# Patient Record
Sex: MALE | Race: WHITE | NOT HISPANIC OR LATINO | Employment: OTHER | URBAN - METROPOLITAN AREA
[De-identification: names, ages, dates, MRNs, and addresses within clinical notes are randomized per-mention and may not be internally consistent; named-entity substitution may affect disease eponyms.]

---

## 2022-01-01 ENCOUNTER — TELEPHONE (OUTPATIENT)
Dept: GERIATRICS | Facility: CLINIC | Age: 77
End: 2022-01-01

## 2022-01-01 ENCOUNTER — NURSING HOME VISIT (OUTPATIENT)
Dept: GERIATRICS | Facility: CLINIC | Age: 77
End: 2022-01-01
Payer: COMMERCIAL

## 2022-01-01 ENCOUNTER — APPOINTMENT (OUTPATIENT)
Dept: RADIOLOGY | Facility: HOSPITAL | Age: 77
End: 2022-01-01
Attending: EMERGENCY MEDICINE
Payer: COMMERCIAL

## 2022-01-01 ENCOUNTER — APPOINTMENT (OUTPATIENT)
Dept: CT IMAGING | Facility: HOSPITAL | Age: 77
End: 2022-01-01
Attending: EMERGENCY MEDICINE
Payer: COMMERCIAL

## 2022-01-01 ENCOUNTER — DOCUMENTATION ONLY (OUTPATIENT)
Dept: GERIATRICS | Facility: CLINIC | Age: 77
End: 2022-01-01

## 2022-01-01 ENCOUNTER — LAB REQUISITION (OUTPATIENT)
Dept: LAB | Facility: CLINIC | Age: 77
End: 2022-01-01
Payer: COMMERCIAL

## 2022-01-01 ENCOUNTER — HOSPITAL ENCOUNTER (EMERGENCY)
Facility: HOSPITAL | Age: 77
Discharge: HOME | End: 2022-07-14
Attending: EMERGENCY MEDICINE | Admitting: EMERGENCY MEDICINE
Payer: COMMERCIAL

## 2022-01-01 ENCOUNTER — CLINICAL UPDATE (OUTPATIENT)
Dept: PHARMACY | Facility: CLINIC | Age: 77
End: 2022-01-01
Payer: COMMERCIAL

## 2022-01-01 VITALS
RESPIRATION RATE: 20 BRPM | TEMPERATURE: 98.1 F | DIASTOLIC BLOOD PRESSURE: 67 MMHG | HEART RATE: 66 BPM | SYSTOLIC BLOOD PRESSURE: 125 MMHG | OXYGEN SATURATION: 93 %

## 2022-01-01 VITALS
OXYGEN SATURATION: 95 % | WEIGHT: 178.2 LBS | HEIGHT: 60 IN | SYSTOLIC BLOOD PRESSURE: 108 MMHG | RESPIRATION RATE: 16 BRPM | BODY MASS INDEX: 34.99 KG/M2 | DIASTOLIC BLOOD PRESSURE: 52 MMHG | HEART RATE: 74 BPM | TEMPERATURE: 97.7 F

## 2022-01-01 VITALS
WEIGHT: 179.8 LBS | DIASTOLIC BLOOD PRESSURE: 63 MMHG | SYSTOLIC BLOOD PRESSURE: 124 MMHG | HEIGHT: 60 IN | BODY MASS INDEX: 35.3 KG/M2 | RESPIRATION RATE: 18 BRPM | TEMPERATURE: 97.4 F | OXYGEN SATURATION: 96 % | HEART RATE: 64 BPM

## 2022-01-01 VITALS
DIASTOLIC BLOOD PRESSURE: 63 MMHG | BODY MASS INDEX: 36.52 KG/M2 | SYSTOLIC BLOOD PRESSURE: 124 MMHG | OXYGEN SATURATION: 94 % | HEIGHT: 60 IN | TEMPERATURE: 97.3 F | RESPIRATION RATE: 18 BRPM | HEART RATE: 64 BPM | WEIGHT: 186 LBS

## 2022-01-01 VITALS
BODY MASS INDEX: 35.65 KG/M2 | TEMPERATURE: 96.8 F | SYSTOLIC BLOOD PRESSURE: 120 MMHG | HEIGHT: 60 IN | DIASTOLIC BLOOD PRESSURE: 63 MMHG | WEIGHT: 181.6 LBS | OXYGEN SATURATION: 92 % | HEART RATE: 65 BPM | RESPIRATION RATE: 18 BRPM

## 2022-01-01 VITALS
RESPIRATION RATE: 18 BRPM | HEIGHT: 60 IN | HEART RATE: 80 BPM | DIASTOLIC BLOOD PRESSURE: 62 MMHG | TEMPERATURE: 98.2 F | SYSTOLIC BLOOD PRESSURE: 132 MMHG | WEIGHT: 187.6 LBS | BODY MASS INDEX: 36.83 KG/M2 | OXYGEN SATURATION: 93 %

## 2022-01-01 VITALS
BODY MASS INDEX: 31.71 KG/M2 | WEIGHT: 161.5 LBS | SYSTOLIC BLOOD PRESSURE: 120 MMHG | HEART RATE: 88 BPM | OXYGEN SATURATION: 97 % | HEIGHT: 60 IN | DIASTOLIC BLOOD PRESSURE: 67 MMHG | TEMPERATURE: 97.1 F | RESPIRATION RATE: 18 BRPM

## 2022-01-01 VITALS
OXYGEN SATURATION: 97 % | RESPIRATION RATE: 16 BRPM | DIASTOLIC BLOOD PRESSURE: 60 MMHG | WEIGHT: 181.6 LBS | TEMPERATURE: 97.4 F | HEIGHT: 60 IN | HEART RATE: 61 BPM | SYSTOLIC BLOOD PRESSURE: 127 MMHG | BODY MASS INDEX: 35.65 KG/M2

## 2022-01-01 VITALS
TEMPERATURE: 97.6 F | OXYGEN SATURATION: 98 % | BODY MASS INDEX: 36.69 KG/M2 | HEIGHT: 60 IN | HEART RATE: 72 BPM | RESPIRATION RATE: 18 BRPM | WEIGHT: 186.9 LBS | DIASTOLIC BLOOD PRESSURE: 67 MMHG | SYSTOLIC BLOOD PRESSURE: 124 MMHG

## 2022-01-01 DIAGNOSIS — G30.0 SEVERE EARLY ONSET ALZHEIMER'S DEMENTIA WITH AGITATION (H): Primary | ICD-10-CM

## 2022-01-01 DIAGNOSIS — Z79.899 MEDICATION MANAGEMENT: ICD-10-CM

## 2022-01-01 DIAGNOSIS — F02.C4 SEVERE EARLY ONSET ALZHEIMER'S DEMENTIA WITH ANXIETY (H): ICD-10-CM

## 2022-01-01 DIAGNOSIS — F02.C11 SEVERE EARLY ONSET ALZHEIMER'S DEMENTIA WITH AGITATION (H): Primary | ICD-10-CM

## 2022-01-01 DIAGNOSIS — I10 ESSENTIAL (PRIMARY) HYPERTENSION: ICD-10-CM

## 2022-01-01 DIAGNOSIS — Z79.4 TYPE 2 DIABETES MELLITUS WITH OTHER SPECIFIED COMPLICATION, WITH LONG-TERM CURRENT USE OF INSULIN (H): Primary | ICD-10-CM

## 2022-01-01 DIAGNOSIS — E11.9 DIABETES MELLITUS, TYPE 2 (H): ICD-10-CM

## 2022-01-01 DIAGNOSIS — F03.91 DEMENTIA WITH BEHAVIORAL DISTURBANCE, UNSPECIFIED DEMENTIA TYPE: Primary | ICD-10-CM

## 2022-01-01 DIAGNOSIS — I10 PRIMARY HYPERTENSION: ICD-10-CM

## 2022-01-01 DIAGNOSIS — E66.01 MORBID OBESITY (H): ICD-10-CM

## 2022-01-01 DIAGNOSIS — E11.69 TYPE 2 DIABETES MELLITUS WITH OTHER SPECIFIED COMPLICATION, WITH LONG-TERM CURRENT USE OF INSULIN (H): ICD-10-CM

## 2022-01-01 DIAGNOSIS — F03.918 AGGRESSIVE BEHAVIOR DUE TO DEMENTIA (H): ICD-10-CM

## 2022-01-01 DIAGNOSIS — E11.69 TYPE 2 DIABETES MELLITUS WITH OTHER SPECIFIED COMPLICATION, UNSPECIFIED WHETHER LONG TERM INSULIN USE (H): Primary | ICD-10-CM

## 2022-01-01 DIAGNOSIS — E11.69 TYPE 2 DIABETES MELLITUS WITH OTHER SPECIFIED COMPLICATION, WITH LONG-TERM CURRENT USE OF INSULIN (H): Primary | ICD-10-CM

## 2022-01-01 DIAGNOSIS — Z79.4 TYPE 2 DIABETES MELLITUS WITH OTHER SPECIFIED COMPLICATION, WITH LONG-TERM CURRENT USE OF INSULIN (H): ICD-10-CM

## 2022-01-01 DIAGNOSIS — G30.9 ALZHEIMER'S DISEASE (H): ICD-10-CM

## 2022-01-01 DIAGNOSIS — R45.1 AGITATION: Primary | ICD-10-CM

## 2022-01-01 DIAGNOSIS — G30.0 SEVERE EARLY ONSET ALZHEIMER'S DEMENTIA WITHOUT BEHAVIORAL DISTURBANCE, PSYCHOTIC DISTURBANCE, MOOD DISTURBANCE, OR ANXIETY (H): ICD-10-CM

## 2022-01-01 DIAGNOSIS — E11.319 TYPE 2 DIABETES MELLITUS WITH UNSPECIFIED DIABETIC RETINOPATHY WITHOUT MACULAR EDEMA (H): ICD-10-CM

## 2022-01-01 DIAGNOSIS — S63.259A DISLOCATION OF FINGER, INITIAL ENCOUNTER: ICD-10-CM

## 2022-01-01 DIAGNOSIS — F02.80 ALZHEIMER'S DISEASE (H): ICD-10-CM

## 2022-01-01 DIAGNOSIS — E78.5 HYPERLIPIDEMIA, UNSPECIFIED: ICD-10-CM

## 2022-01-01 DIAGNOSIS — S01.81XA FACIAL LACERATION, INITIAL ENCOUNTER: ICD-10-CM

## 2022-01-01 DIAGNOSIS — G30.0 SEVERE EARLY ONSET ALZHEIMER'S DEMENTIA WITH ANXIETY (H): ICD-10-CM

## 2022-01-01 DIAGNOSIS — F32.A DEPRESSION, UNSPECIFIED DEPRESSION TYPE: ICD-10-CM

## 2022-01-01 DIAGNOSIS — F02.C0 SEVERE EARLY ONSET ALZHEIMER'S DEMENTIA WITHOUT BEHAVIORAL DISTURBANCE, PSYCHOTIC DISTURBANCE, MOOD DISTURBANCE, OR ANXIETY (H): ICD-10-CM

## 2022-01-01 DIAGNOSIS — D64.9 ANEMIA, UNSPECIFIED: ICD-10-CM

## 2022-01-01 LAB
ALBUMIN SERPL BCG-MCNC: 3.9 G/DL (ref 3.5–5.2)
ALP SERPL-CCNC: 82 U/L (ref 40–129)
ALT SERPL W P-5'-P-CCNC: 20 U/L (ref 10–50)
ANION GAP SERPL CALCULATED.3IONS-SCNC: 8 MMOL/L (ref 7–15)
AST SERPL W P-5'-P-CCNC: 22 U/L (ref 10–50)
BILIRUB SERPL-MCNC: 0.4 MG/DL
BUN SERPL-MCNC: 15.1 MG/DL (ref 8–23)
CALCIUM SERPL-MCNC: 9.3 MG/DL (ref 8.8–10.2)
CHLORIDE SERPL-SCNC: 102 MMOL/L (ref 98–107)
CHOLEST SERPL-MCNC: 187 MG/DL
CREAT SERPL-MCNC: 0.77 MG/DL (ref 0.67–1.17)
DEPRECATED HCO3 PLAS-SCNC: 28 MMOL/L (ref 22–29)
ERYTHROCYTE [DISTWIDTH] IN BLOOD BY AUTOMATED COUNT: 13.2 % (ref 10–15)
GFR SERPL CREATININE-BSD FRML MDRD: >90 ML/MIN/1.73M2
GLUCOSE SERPL-MCNC: 88 MG/DL (ref 70–99)
HBA1C MFR BLD: 9.6 %
HCT VFR BLD AUTO: 43.5 % (ref 40–53)
HDLC SERPL-MCNC: 45 MG/DL
HGB BLD-MCNC: 14.7 G/DL (ref 13.3–17.7)
LDLC SERPL CALC-MCNC: 122 MG/DL
MCH RBC QN AUTO: 31.4 PG (ref 26.5–33)
MCHC RBC AUTO-ENTMCNC: 33.8 G/DL (ref 31.5–36.5)
MCV RBC AUTO: 93 FL (ref 78–100)
NONHDLC SERPL-MCNC: 142 MG/DL
PLATELET # BLD AUTO: 252 10E3/UL (ref 150–450)
POTASSIUM SERPL-SCNC: 3.7 MMOL/L (ref 3.4–5.3)
PROT SERPL-MCNC: 6.4 G/DL (ref 6.4–8.3)
RBC # BLD AUTO: 4.68 10E6/UL (ref 4.4–5.9)
SODIUM SERPL-SCNC: 138 MMOL/L (ref 136–145)
TRIGL SERPL-MCNC: 99 MG/DL
WBC # BLD AUTO: 8 10E3/UL (ref 4–11)

## 2022-01-01 PROCEDURE — 99309 SBSQ NF CARE MODERATE MDM 30: CPT | Performed by: INTERNAL MEDICINE

## 2022-01-01 PROCEDURE — 99305 1ST NF CARE MODERATE MDM 35: CPT | Performed by: INTERNAL MEDICINE

## 2022-01-01 PROCEDURE — 70450 CT HEAD/BRAIN W/O DYE: CPT

## 2022-01-01 PROCEDURE — 99285 EMERGENCY DEPT VISIT HI MDM: CPT | Mod: 25

## 2022-01-01 PROCEDURE — 70486 CT MAXILLOFACIAL W/O DYE: CPT

## 2022-01-01 PROCEDURE — 99310 SBSQ NF CARE HIGH MDM 45: CPT | Performed by: NURSE PRACTITIONER

## 2022-01-01 PROCEDURE — 99309 SBSQ NF CARE MODERATE MDM 30: CPT | Performed by: NURSE PRACTITIONER

## 2022-01-01 PROCEDURE — 26770 TREAT FINGER DISLOCATION: CPT | Mod: F2

## 2022-01-01 PROCEDURE — 73140 X-RAY EXAM OF FINGER(S): CPT | Mod: LT

## 2022-01-01 PROCEDURE — 999N000065 XR FINGER LEFT G/E 2 VIEWS

## 2022-01-01 PROCEDURE — 83036 HEMOGLOBIN GLYCOSYLATED A1C: CPT | Performed by: NURSE PRACTITIONER

## 2022-01-01 PROCEDURE — 85014 HEMATOCRIT: CPT | Performed by: NURSE PRACTITIONER

## 2022-01-01 PROCEDURE — 99318 PR ANNUAL NURSING FAC ASSESSMNT, STABLE: CPT | Performed by: NURSE PRACTITIONER

## 2022-01-01 PROCEDURE — 36415 COLL VENOUS BLD VENIPUNCTURE: CPT | Performed by: NURSE PRACTITIONER

## 2022-01-01 PROCEDURE — 72125 CT NECK SPINE W/O DYE: CPT

## 2022-01-01 PROCEDURE — P9604 ONE-WAY ALLOW PRORATED TRIP: HCPCS | Performed by: NURSE PRACTITIONER

## 2022-01-01 PROCEDURE — 80061 LIPID PANEL: CPT | Performed by: NURSE PRACTITIONER

## 2022-01-01 PROCEDURE — 80053 COMPREHEN METABOLIC PANEL: CPT | Performed by: NURSE PRACTITIONER

## 2022-01-01 PROCEDURE — 12011 RPR F/E/E/N/L/M 2.5 CM/<: CPT

## 2022-01-01 PROCEDURE — 99207 PR NO CHARGE LOS: CPT | Performed by: PHARMACIST

## 2022-01-01 RX ORDER — ACETAMINOPHEN 500 MG
500 TABLET ORAL 3 TIMES DAILY
COMMUNITY

## 2022-01-01 RX ORDER — RISPERIDONE 1 MG/1
TABLET ORAL
COMMUNITY
Start: 2022-01-01 | End: 2022-01-01 | Stop reason: DRUGHIGH

## 2022-01-01 RX ORDER — RISPERIDONE 1 MG/1
TABLET ORAL
COMMUNITY
Start: 2022-01-01

## 2022-01-01 RX ORDER — MULTIVITAMIN,THERAPEUTIC
1 TABLET ORAL DAILY
COMMUNITY

## 2022-01-01 RX ORDER — CITALOPRAM HYDROBROMIDE 10 MG/1
15 TABLET ORAL DAILY
COMMUNITY
Start: 2022-01-01

## 2022-01-01 RX ORDER — LIRAGLUTIDE 6 MG/ML
INJECTION SUBCUTANEOUS
Qty: 3 ML | Refills: 11 | Status: SHIPPED | OUTPATIENT
Start: 2022-01-01 | End: 2022-01-01

## 2022-01-01 RX ORDER — QUETIAPINE FUMARATE 50 MG/1
50 TABLET, FILM COATED ORAL 3 TIMES DAILY
COMMUNITY
Start: 2022-01-01 | End: 2022-01-01

## 2022-01-01 RX ORDER — LORAZEPAM 0.5 MG/1
0.5 TABLET ORAL EVERY 6 HOURS PRN
Qty: 30 TABLET | Refills: 0 | Status: SHIPPED | OUTPATIENT
Start: 2022-01-01

## 2022-01-01 RX ORDER — AMOXICILLIN 250 MG
2 CAPSULE ORAL 2 TIMES DAILY
COMMUNITY

## 2022-01-01 RX ORDER — ESCITALOPRAM OXALATE 20 MG/1
20 TABLET ORAL DAILY
COMMUNITY
End: 2022-01-01 | Stop reason: ALTCHOICE

## 2022-01-01 RX ORDER — HALOPERIDOL 1 MG/1
3 TABLET ORAL 3 TIMES DAILY
COMMUNITY
End: 2022-01-01 | Stop reason: ALTCHOICE

## 2022-01-01 RX ORDER — LIRAGLUTIDE 6 MG/ML
1.2 INJECTION SUBCUTANEOUS DAILY
COMMUNITY

## 2022-01-01 RX ORDER — QUETIAPINE FUMARATE 50 MG/1
50 TABLET, FILM COATED ORAL DAILY PRN
COMMUNITY
End: 2022-01-01

## 2022-01-01 RX ORDER — METFORMIN HCL 500 MG
2000 TABLET, EXTENDED RELEASE 24 HR ORAL
COMMUNITY
Start: 2022-01-01

## 2022-01-01 RX ORDER — QUETIAPINE FUMARATE 25 MG/1
12.5 TABLET, FILM COATED ORAL DAILY PRN
COMMUNITY
End: 2022-01-01

## 2022-01-01 RX ORDER — LANOLIN ALCOHOL/MO/W.PET/CERES
12 CREAM (GRAM) TOPICAL AT BEDTIME
COMMUNITY

## 2022-07-12 NOTE — LETTER
7/12/2022        RE: Beltran Cotter  [address] [facility] [address]        [facility] GERIATRICS  INITIAL VISIT NOTE  July 12, 2022    PRIMARY CARE PROVIDER AND CLINIC:  Danna Esp[facility] [address] [facility] / [place]*    [facility] Medical Record Number:  [id number]  Place of Service where encounter took place:  [facility] () [[id number]]    Chief Complaint   Patient presents with     Establish Care       HPI:    Beltran Cotter is a 77 year old  (1945) male was admitted to the above facility from  home on 6/30/22.  [facility] history of dementia with depressed mood and aggressive behavior, HTN and DM with neuropathy and retinopathy. Ultimate plan is to move to the [facility], but bed not available at this time. He was admitted to this facility for  medical management and nursing care.      History obtained from: facility chart records, facility staff and [facility] chart review.      Today, Mr. Cotter is seen sitting in a chair in the common room. He opened his eyes to my calling his name, but dozed back to sleep. Talked with his nurse as well as the activities staff and he's settling in well. He didn't want to take his metformin this morning, but was fine with the other pills as they are smaller. SLUMS 0/30 at [facility] - no cog testing at [facility]. I believe plan/goal is for him to transfer to a LTC bed at the [facility] when one is available.     CODE STATUS: DNR / DNI    ALLERGIES:  No Known Allergies    PAST MEDICAL HISTORY:   Past Medical History:   Diagnosis Date     Adenoma of large intestine      Alzheimer's disease (H)      Background diabetic retinopathy (H)      Cataract      Diabetes mellitus (H)      Hearing loss      Peripheral neuropathy      Polio        PAST SURGICAL HISTORY:   Past Surgical History:   Procedure Laterality Date     CATARACT REMOVAL       CHOLECYSTECTOMY       COLONOSCOPY       ORIF LEFT  ANKLE FX         FAMILY HISTORY:   Family History   Problem Relation Age of Onset     Cervical Cancer Mother      Dementia Father      Lung Cancer Maternal Grandfather      SOCIAL HISTORY:   Patient's living condition: lives in a skilled nursing facility    MEDICATIONS  Post Discharge Medication Reconciliation Status: discharge medications reconciled and changed, per note/orders.  Current Outpatient Medications   Medication Sig Dispense Refill     acetaminophen (TYLENOL) 500 MG tablet Take 500 mg by mouth every 6 hours as needed for mild pain       Carboxymethylcellulose Sodium 0.25 % SOLN Apply 1 drop to eye 3 times daily To both eyes TID for dry eyes       escitalopram (LEXAPRO) 20 MG tablet Take 20 mg by mouth daily       glucose (BD GLUCOSE) 4 g chewable tablet Take 4 tablets by mouth as needed for low blood sugar (for glucose <70)       insulin glargine (LANTUS PEN) 100 UNIT/ML pen Inject 30 Units Subcutaneous every morning       melatonin 3 MG tablet Take 12 mg by mouth At Bedtime       metFORMIN (GLUCOPHAGE) 500 MG tablet Take 2,000 mg by mouth daily (with breakfast)       multivitamin, therapeutic (THERA-VIT) TABS tablet Take 1 tablet by mouth daily       QUEtiapine (SEROQUEL) 25 MG tablet Take 12.5 mg by mouth daily as needed (agitation)       QUEtiapine (SEROQUEL) 50 MG tablet Take 50 mg by mouth 2 times daily       QUEtiapine (SEROQUEL) 50 MG tablet Take 50 mg by mouth daily as needed (agitation)         ROS:  Unable to obtain due to cognitive impairment or aphasia. SLUMS 0/30.     PHYSICAL EXAM:  /62   Pulse 80   Temp 98.2  F (36.8  C)   Resp 18   Ht 1.524 m (5')   Wt 85.1 kg (187 lb 9.6 oz)   SpO2 93%   BMI 36.64 kg/m    Gen: sitting in chair, eyes closed and in no acute distress  HEENT: normocephalic; moist mucous membranes in mouth; unable to assess hearing as was not responding to me calling his name; eyes without scleral icterus or scleral injection   Resp: breathing non labored, no  tachypnea   Ext: decreased muscle tone no LE edema  Neuro: CX II-XII grossly in tact; ROM in all four extremities grossly in tact  Psych: memory, judgement and insight impaired.   Skin: pale; no obvious concerning rashes or lesions    LABORATORY/IMAGING DATA:  Reviewed as per [facility] and/or [facility]    ASSESSMENT/PLAN:    Dementia With Behavioral Disturbance  SLUMS 0/30. He is settling in OK per staff  -- escitalopram 20 mg daily  -- quetiapine 50 mg BID and 50 mg daily PRN as well as 12.5 mg BID PRN for agitation   -- ongoing 24/7 nursing and supportive cares    HTN  SBPs - no trend in PCC. He is not on any anti-HTN medications.   -- follow BPs     DM, Type II  Hgb A1c not known. Sugars 130s-180s (am), 160s-250s (hs). He's not wanting to take the metformin per nursing  -- glargine 30 units in the morning  -- metformin 2000 mg in the morning   -- if continues to refuse, am OK discontinuing    this if spouse is Ok with it    Electronically signed by:  Gail Milian MD                 Sincerely,        Gail Milian MD

## 2022-07-12 NOTE — PROGRESS NOTES
[facility] GERIATRICS  INITIAL VISIT NOTE  July 12, 2022    PRIMARY CARE PROVIDER AND CLINIC:  Danna Espinal [facility] [address] [facility] / [place]*    [facility] Medical Record Number:  [id number]  Place of Service where encounter took place:  [facility] () [[id number]]    Chief Complaint   Patient presents with     Establish Care       HPI:    Beltran Cotter is a 77 year old  (1945) male was admitted to the above facility from  home on 6/30/22.  [facility] history of dementia with depressed mood and aggressive behavior, HTN and DM with neuropathy and retinopathy. Ultimate plan is to move to the [facility], but bed not available at this time. He was admitted to this facility for  medical management and nursing care.      History obtained from: facility chart records, facility staff and [facility] chart review.      Today, Mr. Cotter is seen sitting in a chair in the common room. He opened his eyes to my calling his name, but dozed back to sleep. Talked with his nurse as well as the activities staff and he's settling in well. He didn't want to take his metformin this morning, but was fine with the other pills as they are smaller. SLUMS 0/30 at [facility] - no cog testing at [facility]. I believe plan/goal is for him to transfer to a LTC bed at the [facility] when one is available.     CODE STATUS: DNR / DNI    ALLERGIES:  No Known Allergies    PAST MEDICAL HISTORY:   Past Medical History:   Diagnosis Date     Adenoma of large intestine      Alzheimer's disease (H)      Background diabetic retinopathy (H)      Cataract      Diabetes mellitus (H)      Hearing loss      Peripheral neuropathy      Polio        PAST SURGICAL HISTORY:   Past Surgical History:   Procedure Laterality Date     CATARACT REMOVAL       CHOLECYSTECTOMY       COLONOSCOPY       ORIF LEFT ANKLE FX         FAMILY HISTORY:   Family History   Problem Relation Age of Onset     Cervical Cancer  Mother      Dementia Father      Lung Cancer Maternal Grandfather      SOCIAL HISTORY:   Patient's living condition: lives in a skilled nursing facility    MEDICATIONS  Post Discharge Medication Reconciliation Status: discharge medications reconciled and changed, per note/orders.  Current Outpatient Medications   Medication Sig Dispense Refill     acetaminophen (TYLENOL) 500 MG tablet Take 500 mg by mouth every 6 hours as needed for mild pain       Carboxymethylcellulose Sodium 0.25 % SOLN Apply 1 drop to eye 3 times daily To both eyes TID for dry eyes       escitalopram (LEXAPRO) 20 MG tablet Take 20 mg by mouth daily       glucose (BD GLUCOSE) 4 g chewable tablet Take 4 tablets by mouth as needed for low blood sugar (for glucose <70)       insulin glargine (LANTUS PEN) 100 UNIT/ML pen Inject 30 Units Subcutaneous every morning       melatonin 3 MG tablet Take 12 mg by mouth At Bedtime       metFORMIN (GLUCOPHAGE) 500 MG tablet Take 2,000 mg by mouth daily (with breakfast)       multivitamin, therapeutic (THERA-VIT) TABS tablet Take 1 tablet by mouth daily       QUEtiapine (SEROQUEL) 25 MG tablet Take 12.5 mg by mouth daily as needed (agitation)       QUEtiapine (SEROQUEL) 50 MG tablet Take 50 mg by mouth 2 times daily       QUEtiapine (SEROQUEL) 50 MG tablet Take 50 mg by mouth daily as needed (agitation)         ROS:  Unable to obtain due to cognitive impairment or aphasia. SLUMS 0/30.     PHYSICAL EXAM:  /62   Pulse 80   Temp 98.2  F (36.8  C)   Resp 18   Ht 1.524 m (5')   Wt 85.1 kg (187 lb 9.6 oz)   SpO2 93%   BMI 36.64 kg/m    Gen: sitting in chair, eyes closed and in no acute distress  HEENT: normocephalic; moist mucous membranes in mouth; unable to assess hearing as was not responding to me calling his name; eyes without scleral icterus or scleral injection   Resp: breathing non labored, no tachypnea   Ext: decreased muscle tone no LE edema  Neuro: CX II-XII grossly in tact; ROM in all four  extremities grossly in tact  Psych: memory, judgement and insight impaired.   Skin: pale; no obvious concerning rashes or lesions    LABORATORY/IMAGING DATA:  Reviewed as per [facility] and/or [facility]    ASSESSMENT/PLAN:    Dementia With Behavioral Disturbance  SLUMS 0/30. He is settling in OK per staff  -- escitalopram 20 mg daily  -- quetiapine 50 mg BID and 50 mg daily PRN as well as 12.5 mg BID PRN for agitation   -- ongoing 24/7 nursing and supportive cares    HTN  SBPs - no trend in PCC. He is not on any anti-HTN medications.   -- follow BPs     DM, Type II  Hgb A1c not known. Sugars 130s-180s (am), 160s-250s (hs). He's not wanting to take the metformin per nursing  -- glargine 30 units in the morning  -- metformin 2000 mg in the morning   -- if continues to refuse, am OK discontinuing    this if spouse is Ok with it    Electronically signed by:  Gail Milian MD

## 2022-07-15 NOTE — ED PROVIDER NOTES
EMERGENCY DEPARTMENT ENCOUNTER     NAME: Beltran Cotter   AGE: 77 year old male   YOB: 1945   MRN: [id number]   EVALUATION DATE & TIME: 7/14/2022  7:43 PM   PCP: Danna Espinal     Chief Complaint   Patient presents with     Fall   :    FINAL IMPRESSION       1. Dislocation of finger, initial encounter    2. Facial laceration, initial encounter           ED COURSE & MEDICAL DECISION MAKING    7:44 PM I met with the patient to gather history and to perform my initial exam. We discussed plans for the ED course, including diagnostic testing and treatment. I saw the patient wearing a face shield, N95 mask, surgical mask, surgical gown, and gloves.   8:25 PM Rechecked and updated patient. Performed reduction procedure.   8:59 PM Rechecked and updated patient.     Pertinent Labs & Imaging studies reviewed. (See chart for details)   77 year old male  presents to the Emergency Department for evaluation of a fall when he had a witnessed fall into a medication cart at his [facility]. Initial Vitals Reviewed. Initial exam notable for patient who is sleeping but intermittently agitated with a history of dementia, has an obviously deformed left middle finger and some dried blood over his left eyebrow.  I did do a CT scan of his head, cervical spine, and facial bones to look for fracture, skull fracture, intracranial hemorrhage and this was fortunately negative.  I suspected finger dislocation versus fracture and x-ray confirmed a dislocation.  I reduce this manually and placed his finger in a splint, post reduction films show resolution.  Once the area on his head was irrigated it is obvious that there is a superficial laceration.  It was oozing blood, so I did a skin glue repair after irrigation.  At this time, I will discharge back to his facility.           At the conclusion of the encounter I discussed the results of all of the tests and the disposition. The questions were answered. The patient or family  acknowledged understanding and was agreeable with the care plan.         MEDICATIONS GIVEN IN THE EMERGENCY:   Medications - No data to display   NEW PRESCRIPTIONS STARTED AT TODAY'S ER VISIT   New Prescriptions    No medications on file     ================================================================   HISTORY OF PRESENT ILLNESS     HPI is severely limited due to patient Alzheimer's and dementia     Patient information was obtained from: EMS     Use of Intrepreter: N/A     Beltran Cotter is a 77 year old male with a pertinent medical history of Alzheimer's disease, dementia, T2DM, diabetic neuropathy, peripheral neuropathy, Polio, who presents to the ED for evaluation of a finger injury.     Per Chart Review, patient's last TD/TDAP was 07/19/2013.    Per EMS, patient fell into a med cart at his memory care unit just prior to arrival causing him to deform his left middle finger and develop an abrasion on his left eyebrow.     ================================================================    REVIEW OF SYSTEMS       Review of Systems   Unable to perform ROS: Dementia         PAST HISTORY     PAST MEDICAL HISTORY:   Past Medical History:   Diagnosis Date     Adenoma of large intestine      Alzheimer's disease (H)      Background diabetic retinopathy (H)      Cataract      Diabetes mellitus (H)      Hearing loss      Peripheral neuropathy      Polio       PAST SURGICAL HISTORY:   Past Surgical History:   Procedure Laterality Date     CATARACT REMOVAL       CHOLECYSTECTOMY       COLONOSCOPY       ORIF LEFT ANKLE FX        CURRENT MEDICATIONS:   acetaminophen (TYLENOL) 500 MG tablet  Carboxymethylcellulose Sodium 0.25 % SOLN  escitalopram (LEXAPRO) 20 MG tablet  glucose (BD GLUCOSE) 4 g chewable tablet  insulin glargine (LANTUS PEN) 100 UNIT/ML pen  melatonin 3 MG tablet  metFORMIN (GLUCOPHAGE) 500 MG tablet  multivitamin, therapeutic (THERA-VIT) TABS tablet  QUEtiapine (SEROQUEL) 25 MG tablet  QUEtiapine (SEROQUEL) 50  MG tablet  QUEtiapine (SEROQUEL) 50 MG tablet      ALLERGIES:   No Known Allergies   FAMILY HISTORY:   Family History   Problem Relation Age of Onset     Cervical Cancer Mother      Dementia Father      Lung Cancer Maternal Grandfather       SOCIAL HISTORY:   Social History     Socioeconomic History     Marital status: Unknown   Tobacco Use     Smoking status: Former Smoker     Smokeless tobacco: Never Used        VITALS  Patient Vitals for the past 24 hrs:   BP Temp Temp src Pulse Resp SpO2   07/14/22 1953 (!) 142/76 98.1  F (36.7  C) Oral 87 19 93 %        ================================================================    PHYSICAL EXAM     VITAL SIGNS: BP (!) 142/76   Pulse 87   Temp 98.1  F (36.7  C) (Oral)   Resp 19   SpO2 93%    Constitutional:  Awake, no acute distress   HENT:  Approximately 2 cm Y shape laceration at left eyebrow present, oropharynx without exudate or erythema, membranes moist  Lymph:  No adenopathy  Eyes: Abrasion on left eyebrow. EOM intact, PERRL, no injection  Neck: Supple  Respiratory:  Clear to auscultation bilaterally, no wheezes or crackles   Cardiovascular:  Regular rate and rhythm, single S1 and S2   GI:  Soft, nontender, nondistended, no rebound or guarding   Musculoskeletal:  Deformed left middle finger. No lower extremity edema.    Skin:  Warm, dry  Neurologic:  Alert and oriented x3, no focal deficits noted       ================================================================  LAB       None.    ===============================================================  RADIOLOGY       Reviewed all pertinent imaging. Please see official radiology report.   Fingers XR, 2-3 views, left   Preliminary Result   IMPRESSION: The PIP joint of the long finger has been relocated. There is currently anatomic position and alignment. There is a small minimally displaced oblique intra-articular fracture of the volar aspect of the base of the middle phalanx. 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         Cervical spine CT w/o contrast   Final Result   IMPRESSION:   HEAD CT:   1.  No CT evidence for acute intracranial process.   2.  Brain atrophy and presumed chronic microvascular ischemic changes as above.      FACIAL BONE CT:   1.  No facial bone or mandibular fracture.      CERVICAL SPINE CT:   1.  No fracture or posttraumatic subluxation.   2.  No high-grade spinal canal or neural foraminal stenosis.      CT Facial Bones without Contrast   Final Result   IMPRESSION:   HEAD CT:   1.  No CT evidence for acute intracranial process.   2.  Brain atrophy and presumed chronic microvascular ischemic changes as above.      FACIAL BONE CT:   1.  No facial bone or mandibular fracture.      CERVICAL SPINE CT:   1.  No fracture or posttraumatic subluxation.   2.  No high-grade spinal canal or neural foraminal stenosis.      Head CT w/o contrast   Final Result   IMPRESSION:   HEAD CT:   1.  No CT evidence for acute intracranial process.   2.  Brain atrophy and presumed chronic microvascular ischemic changes as above.      FACIAL BONE CT:   1.  No facial bone or mandibular fracture.      CERVICAL SPINE CT:   1.  No fracture or posttraumatic subluxation.   2.  No high-grade spinal canal or neural foraminal stenosis.      Fingers XR, 2-3 views, left   Final Result   IMPRESSION: Acute dislocation at the PIP joint of the left third finger, with the middle and distal phalanx ulnar relative to the proximal phalanx. No fracture visualized. No acute bone or joint abnormality otherwise.               ================================================================  EKG       None.     ================================================================  PROCEDURES     PROCEDURE: Reduction   INDICATIONS: Left middle finger   PROCEDURE PROVIDER: Dr Ame Vitale   CONSENT: Risks, benefits and alternatives were discussed with and Verbal consent was obtained from Patient.   MEDICATIONS: None.   REDUCTION  PROCEDURE DESCRIPTION: Manual relocation with medial force and traction.    COMPLICATIONS:  Patient tolerated procedure well, without complication     PROCEDURE: Laceration Repair   INDICATIONS: Laceration   PROCEDURE PROVIDER: Dr Ame Vitale   SITE: Left eyebrow   TYPE/SIZE: simple, clean and no foreign body visualized  2 cm (total length)   FUNCTIONAL ASSESSMENT: Distal sensation, circulation and motor intact   MEDICATION: None.   PREPARATION: irrigation with Normal saline   DEBRIDEMENT: no debridement   CLOSURE:  Superficial layer closed with Dermabond (medical glue)    Total number of sutures/staples placed: 0               I, Hung Tang, am serving as a scribe to document services personally performed by Dr. Linton based on my observation and the provider's statements to me. I, Ame Linton MD attest that Hung Tang is acting in a scribe capacity, has observed my performance of the services and has documented them in accordance with my direction.   Ame Linton M.D.   Emergency Medicine   [facility] EMERGENCY DEPARTMENT  [facility] [facility] [address]  [phone]  Dept: [phone]        Ame Linton MD  07/14/22 [id number]

## 2022-07-15 NOTE — ED TRIAGE NOTES
Patient arrives via EMS from [facility]. Resides in memory care unit and has advanced dementia. Per staff very aggressive and combative. Had witnessed fall no loss of consciousness. Bumped head on med cart and left eye abrasion. Left finger disfigured. Cooperative at this time.      Triage Assessment     Row Name 07/14/22 1943       Triage Assessment (Adult)    Airway WDL WDL       Respiratory WDL    Respiratory WDL WDL       Skin Circulation/Temperature WDL    Skin Circulation/Temperature WDL WDL  injued finger warm, pink       Cardiac WDL    Cardiac WDL WDL       Peripheral/Neurovascular WDL    Peripheral Neurovascular WDL WDL       Cognitive/Neuro/Behavioral WDL    Cognitive/Neuro/Behavioral WDL orientation    Orientation oriented x 4

## 2022-07-15 NOTE — DISCHARGE INSTRUCTIONS
Beltran had a dislocated finger that we were able to successfully relocate tonight.  A CT scan of his head, facial bones, and cervical spine is negative for any other injuries.  The laceration on his face was repaired with glue and I recommend it staying dry for 24 hours and then he can shower and bathe normally.  He should wear the splint on his finger for about 2 weeks.

## 2022-07-21 NOTE — PROGRESS NOTES
Orders:    EKG - Dx. HTN    discontinue PRN Seroquel    Dr. Whitney Espinal, APRN, DNP, AGNP-BC, PMHNP-BC  [facility]  Office Hours: Tues-Fri 7571-0600  Office: [address] [place] [address]  Fax - [phone]  Triage Phone- [phone]  Business Office- [phone]      Email: [email or web address]

## 2022-07-22 NOTE — TELEPHONE ENCOUNTER
[facility] Geriatrics Triage Nurse Telephone Encounter    Provider: TEZ Silver DNP  Facility: [facility] Facility Type:  [facility]    Caller: Marita  Call Back Number: [phone]    Allergies:  No Known Allergies     Reason for call: Nurse is calling to report ECG results.  The ECG was ordered due to recently starting on Celexa.  See results below:          Verbal Order/Direction given by Provider: No new orders.      Provider giving Order:  TEZ Silver DNP    Verbal Order given to: Carroll Ramsey RN

## 2022-07-28 NOTE — TELEPHONE ENCOUNTER
[facility] Triage Nurse Telephone Encounter    Provider: TEZ Silver DNP  Facility: [facility] Facility Type:  LTC    Caller: Nurse  Call Back Number:     Allergies:  No Known Allergies     Reason for call: Nurse called to report that patient wasn't given his PM medications last evening due to sleeping.  Nurse did not want to wake patient up to administer his medications in case it would cause behaviors.  Patient slept all night and no behaviors noted.  Nurse is wondering if she can have an order to hold PM medications especially if patient is sleeping.      Verbal Order/Direction given by Provider: Okay to hold PM medications if patient is sleeping.  PRN Seroquel available if patient does wake in the night with behaviors.      Provider giving Order:  Gail Bonner MD    Verbal Order given to: Nurse    Rosa Acosta RN

## 2022-08-05 NOTE — LETTER
8/5/2022        RE: Beltran Cotter  [facility]  [address]  [facility] [address]        [facility] GERIATRICS  Chief Complaint   Patient presents with     retirement Regulatory     [place] Medical Record Number:  [id number]  Place of Service where encounter took place:  [facility] () [[id number]]    HPI:    Beltran Cotter  is 77 year old (1945), who is being seen today for a federally mandated E/M visit. 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 Patient notes he is doing okay - he is resting when we meet. Staff note ongoing behavioral concerns and inability to sleep at nighttime - thus why he is sleeping at the time of our visit. Appetite is well. He is not sleeping well. Denies CP, palpitations, fatigue, nausea, vomiting, increased SOB/GEE, fever, chills, and/or b/b concerns today.    Facility Behavioral Notes:          ALLERGIES:Patient has no known allergies.  PAST MEDICAL HISTORY:   Past Medical History:   Diagnosis Date     Adenoma of large intestine      Alzheimer's disease (H)      Background diabetic retinopathy (H)      Cataract      Diabetes mellitus (H)      Hearing loss      Peripheral neuropathy      Polio      PAST SURGICAL HISTORY:   has a past surgical history that includes Cholecystectomy; CATARACT REMOVAL; ORIF LEFT ANKLE FX; and colonoscopy.  FAMILY HISTORY: family history includes Cervical Cancer in his mother; Dementia in his father; Lung Cancer in his maternal grandfather.  SOCIAL HISTORY:  reports that he has quit smoking. He has never used smokeless tobacco.    MEDICATIONS:     Review of your medicines          Accurate as of August 5, 2022  2:31 PM. If you have any questions, ask your nurse or doctor.             CONTINUE these medicines which have NOT CHANGED      Dose / Directions   acetaminophen 500 MG tablet  Commonly known as: TYLENOL      Dose: 500 mg  Take 500 mg by mouth every 6 hours as needed for mild pain  Refills: 0     Carboxymethylcellulose Sodium 0.25 % Soln      Dose: 1 drop  Apply 1 drop to eye 3 times daily To both eyes TID for dry eyes  Refills: 0     escitalopram 20 MG tablet  Commonly known as: LEXAPRO      Dose: 20 mg  Take 20 mg by mouth daily  Refills: 0     glucose 4 g chewable tablet  Commonly known as: BD GLUCOSE      Dose: 4 tablet  Take 4 tablets by mouth as needed for low blood sugar (for glucose <70)  Refills: 0     insulin glargine 100 UNIT/ML pen  Commonly known as: LANTUS PEN      Dose: 30 Units  Inject 30 Units Subcutaneous every morning  Refills: 0     melatonin 3 MG tablet      Dose: 12 mg  Take 12 mg by mouth At Bedtime  Refills: 0     metFORMIN 500 MG tablet  Commonly known as: GLUCOPHAGE      Dose: 2,000 mg  Take 2,000 mg by mouth daily (with breakfast)  Refills: 0     multivitamin, therapeutic Tabs tablet      Dose: 1 tablet  Take 1 tablet by mouth daily  Refills: 0     * QUEtiapine 50 MG tablet  Commonly known as: SEROquel      Dose: 50 mg  Take 50 mg by mouth 2 times daily  Refills: 0     * QUEtiapine 50 MG tablet  Commonly known as: SEROquel      Dose: 50 mg  Take 50 mg by mouth daily as needed (agitation)  Refills: 0     * QUEtiapine 25 MG tablet  Commonly known as: SEROquel      Dose: 12.5 mg  Take 12.5 mg by mouth daily as needed (agitation)  Refills: 0         * This list has 3 medication(s) that are the same as other medications prescribed for you. Read the directions carefully, and ask your doctor or other care provider to review them with you.               Case Management:  I have reviewed the care plan and MDS and do agree with the plan. Patient's desire to return to the community is not assessible due to cognitive impairment. Information reviewed:  Medications, vital  signs, orders, and nursing notes.    ROS:  10 point ROS of systems including Constitutional, Eyes, Respiratory, Cardiovascular, Gastroenterology, Genitourinary, Integumentary, Musculoskeletal, Psychiatric were all negative except for pertinent positives noted in my HPI.    Vitals:  /67   Pulse 72   Temp 97.6  F (36.4  C)   Resp 18   Ht 1.524 m (5')   Wt 84.8 kg (186 lb 14.4 oz)   SpO2 98%   BMI 36.50 kg/m    Body mass index is 36.5 kg/m .  Exam:  GENERAL APPEARANCE:  Alert, in no distress, appears healthy, somnolent, sleepy, elderlay male resting in bed  EYES:  EOM, conjunctivae, lids, pupils and irises normal  RESP:  no respiratory distress  CV:  no edema  M/S:   Gait and station abnormal - JA 2/2 patient being in bed  Digits and nails abnormal - arthritic changes present  SKIN:  Inspection of skin and subcutaneous tissue baseline, Palpation of skin and subcutaneous tissue baseline  PSYCH:  oriented to self, insight and judgement impaired, memory impaired     Lab/Diagnostic data:   Recent labs in [facility] reviewed by me today.   CBC RESULTS: No results for input(s): WBC, RBC, HGB, HCT, MCV, MCH, MCHC, RDW, PLT in the last 42165 hours.    Last Basic Metabolic Panel:  No results for input(s): NA, POTASSIUM, CHLORIDE, NADIYA, CO2, BUN, CR, GLC in the last 82754 hours.    Liver Function Studies - No results for input(s): PROTTOTAL, ALBUMIN, BILITOTAL, ALKPHOS, AST, ALT, BILIDIRECT in the last 42573 hours.    No results found for: TSH]    No results found for: A1C      ASSESSMENT/PLAN    ICD-10-CM    1. Dementia with behavioral disturbance, unspecified dementia type (H)  F03.91 Chronic - unstable. Ongoing and increased behaviors as noted above - will increase    2. 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 Recheck CMP, CBC and FLP.   3. Type 2 diabetes mellitus with other  specified complication, with long-term current use of insulin (H)  E11.69 Chronic - variable. BG levels as shown below - continue regimen of Metformin and Glargine - Recheck Hgb A1c    Z79.4        Electronically signed by:  Dr. Whitney Espinal, TEZ, DNP, AGNP-BC, PMHNP-BC  [facility]  Office Hours: Tues-Fri 2814-1407  Office: [address] [facility] [address] [place] [address]  Fax - [phone]  Triage Phone- [phone]  Business Office- [phone]      Email: [email or web address]                       Sincerely,        TEZ Ch CNP

## 2022-08-05 NOTE — PROGRESS NOTES
[facility] GERIATRICS  Chief Complaint   Patient presents with     [facility] Regulatory     [place] Medical Record Number:  [id number]  Place of Service where encounter took place:  [facility] () [[id number]]    HPI:    Beltran Cotter  is 77 year old (1945), who is being seen today for a federally mandated E/M visit. 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 Patient notes he is doing okay - he is resting when we meet. Staff note ongoing behavioral concerns and inability to sleep at nighttime - thus why he is sleeping at the time of our visit. Appetite is well. He is not sleeping well. Denies CP, palpitations, fatigue, nausea, vomiting, increased SOB/GEE, fever, chills, and/or b/b concerns today.    Facility Behavioral Notes:          ALLERGIES:Patient has no known allergies.  PAST MEDICAL HISTORY:   Past Medical History:   Diagnosis Date     Adenoma of large intestine      Alzheimer's disease (H)      Background diabetic retinopathy (H)      Cataract      Diabetes mellitus (H)      Hearing loss      Peripheral neuropathy      Polio      PAST SURGICAL HISTORY:   has a past surgical history that includes Cholecystectomy; CATARACT REMOVAL; ORIF LEFT ANKLE FX; and colonoscopy.  FAMILY HISTORY: family history includes Cervical Cancer in his mother; Dementia in his father; Lung Cancer in his maternal grandfather.  SOCIAL HISTORY:  reports that he has quit smoking. He has never used smokeless tobacco.    MEDICATIONS:     Review of your medicines          Accurate as of August 5, 2022  2:31 PM. If you have any questions, ask your nurse or doctor.            CONTINUE these medicines which have NOT CHANGED      Dose / Directions   acetaminophen 500 MG  tablet  Commonly known as: TYLENOL      Dose: 500 mg  Take 500 mg by mouth every 6 hours as needed for mild pain  Refills: 0     Carboxymethylcellulose Sodium 0.25 % Soln      Dose: 1 drop  Apply 1 drop to eye 3 times daily To both eyes TID for dry eyes  Refills: 0     escitalopram 20 MG tablet  Commonly known as: LEXAPRO      Dose: 20 mg  Take 20 mg by mouth daily  Refills: 0     glucose 4 g chewable tablet  Commonly known as: BD GLUCOSE      Dose: 4 tablet  Take 4 tablets by mouth as needed for low blood sugar (for glucose <70)  Refills: 0     insulin glargine 100 UNIT/ML pen  Commonly known as: LANTUS PEN      Dose: 30 Units  Inject 30 Units Subcutaneous every morning  Refills: 0     melatonin 3 MG tablet      Dose: 12 mg  Take 12 mg by mouth At Bedtime  Refills: 0     metFORMIN 500 MG tablet  Commonly known as: GLUCOPHAGE      Dose: 2,000 mg  Take 2,000 mg by mouth daily (with breakfast)  Refills: 0     multivitamin, therapeutic Tabs tablet      Dose: 1 tablet  Take 1 tablet by mouth daily  Refills: 0     * QUEtiapine 50 MG tablet  Commonly known as: SEROquel      Dose: 50 mg  Take 50 mg by mouth 2 times daily  Refills: 0     * QUEtiapine 50 MG tablet  Commonly known as: SEROquel      Dose: 50 mg  Take 50 mg by mouth daily as needed (agitation)  Refills: 0     * QUEtiapine 25 MG tablet  Commonly known as: SEROquel      Dose: 12.5 mg  Take 12.5 mg by mouth daily as needed (agitation)  Refills: 0         * This list has 3 medication(s) that are the same as other medications prescribed for you. Read the directions carefully, and ask your doctor or other care provider to review them with you.               Case Management:  I have reviewed the care plan and MDS and do agree with the plan. Patient's desire to return to the community is not assessible due to cognitive impairment. Information reviewed:  Medications, vital signs, orders, and nursing notes.    ROS:  10 point ROS of systems including Constitutional, Eyes,  Respiratory, Cardiovascular, Gastroenterology, Genitourinary, Integumentary, Musculoskeletal, Psychiatric were all negative except for pertinent positives noted in my HPI.    Vitals:  /67   Pulse 72   Temp 97.6  F (36.4  C)   Resp 18   Ht 1.524 m (5')   Wt 84.8 kg (186 lb 14.4 oz)   SpO2 98%   BMI 36.50 kg/m    Body mass index is 36.5 kg/m .  Exam:  GENERAL APPEARANCE:  Alert, in no distress, appears healthy, somnolent, sleepy, elderlay male resting in bed  EYES:  EOM, conjunctivae, lids, pupils and irises normal  RESP:  no respiratory distress  CV:  no edema  M/S:   Gait and station abnormal - JA 2/2 patient being in bed  Digits and nails abnormal - arthritic changes present  SKIN:  Inspection of skin and subcutaneous tissue baseline, Palpation of skin and subcutaneous tissue baseline  PSYCH:  oriented to self, insight and judgement impaired, memory impaired     Lab/Diagnostic data:   Recent labs in [facility] reviewed by me today.   CBC RESULTS: No results for input(s): WBC, RBC, HGB, HCT, MCV, MCH, MCHC, RDW, PLT in the last 09754 hours.    Last Basic Metabolic Panel:  No results for input(s): NA, POTASSIUM, CHLORIDE, NADIYA, CO2, BUN, CR, GLC in the last 70902 hours.    Liver Function Studies - No results for input(s): PROTTOTAL, ALBUMIN, BILITOTAL, ALKPHOS, AST, ALT, BILIDIRECT in the last 59616 hours.    No results found for: TSH]    No results found for: A1C      ASSESSMENT/PLAN    ICD-10-CM    1. Dementia with behavioral disturbance, unspecified dementia type (H)  F03.91 Chronic - unstable. Ongoing and increased behaviors as noted above - will increase    2. 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 Recheck CMP, CBC and FLP.   3. Type 2 diabetes mellitus with other specified complication, with long-term current use of insulin (H)  E11.69 Chronic - variable. BG levels as  shown below - continue regimen of Metformin and Glargine - Recheck Hgb A1c    Z79.4        Electronically signed by:  Dr. Whitney Espinal, APRN, DNP, AGNP-BC, PMHNP-BC  [facility]  Office Hours: Tues-Fri 3633-9204  Office: [address] [place] [address]  Fax - [phone]  Triage Phone- [phone]  Business Office- [phone]      Email: [email or web address]

## 2022-08-12 NOTE — PROGRESS NOTES
This patient's medication list and chart were reviewed as part of the service provided by [facility] and Geriatric Services.    Assessment/Recommendations:  1. (Dementia w/ behavioral disturbance, Depression):  Per chart review, history of aggressive behavior.  If patient has been stable and not currently at risk of harming self or others, and no upsetting hallucinations/delusions, may be of benefit to reduce current scheduled quetiapine from 50mg twice daily to 25mg in the morning & 50mg at bedtime and monitor target behaviors; continue GDR as able and appropriate.  Quetiapine may increase risk of orthostasis, dizziness, falls, confusion, mortality, as well as elevate blood sugars, contribute to metabolic adverse effects.  Also on escitalopram, and of note is taking dose > 10mg recommended in geriatrics due to concern for QTc prolongation.  I do see an EKG was completed, and appears QTc interval ok (410ms).  Combo of quetiapine and escitalopram may further increase risk of QTc prolongation, so may benefit from periodic monitoring, especially if meds added that may contribute to QTc prolongation, or if symptoms noted.  2. (Diabetes):  On chart review, note that patient will refuse metformin at times, appears due to size of pills.  Epic med list indicates patient is taking 2000mg daily with breakfast, so I am thinking patient is on ER tabs, but this is not indicated in Epic.  Would likely be of benefit to change to the IR formulation, 1000mg tabs, and give 1000mg twice daily with food.  Could crush the tab and put in food if prefers.  Taking one tablet twice daily (vs 4 all at once), and the ability to crush the IR tabs if needed, may help with compliance.      Rama Dumont, Pharm.D.,[facility]  Board Certified Geriatric Pharmacist  Medication Therapy Management Pharmacist  [phone]

## 2022-08-16 NOTE — TELEPHONE ENCOUNTER
[facility] Geriatrics Triage Nurse Telephone Encounter    Provider: TEZ Silver DNP  Facility: [facility] Facility Type:  LTC    Allergies:  No Known Allergies     Reason for call: Pt has not had a bowel movement in 2 days.    Verbal Order/Direction given by Provider: Bowel: Constipation (Perform steps sequentially)    1) Perform rectal check to determine if impaction is present, administer Bisacodyl suppository 10 mg NV bid prn    2) Encourage 2,000 ml daily fluid intake unless contraindicated    3) If no bowel regimen, start Senna S 1 tab or Miralax 17 grams and 8 oz. of fluid; daily if constipated    Provider giving Order:  TEZ Silver DNP    Verbal Order given to: facility nurse    Kellie Wang RN

## 2022-08-19 PROBLEM — E11.9 DIABETES MELLITUS, TYPE 2 (H): Status: ACTIVE | Noted: 2022-01-01

## 2022-08-19 NOTE — PROGRESS NOTES
[facility] GERIATRIC SERVICES  Chief Complaint   Patient presents with     Nursing Home Acute     Recheck Medication         HPI:  Beltran Cotter is a 77 year old  (1945), who is being seen today for an episodic care visit at: [facility] () [[id number]].     Writer received following medication recommendations from [facility] Pharmacist:  Assessment/Recommendations:  1. (Dementia w/ behavioral disturbance, Depression):  Per chart review, history of aggressive behavior.  If patient has been stable and not currently at risk of harming self or others, and no upsetting hallucinations/delusions, may be of benefit to reduce current scheduled quetiapine from 50mg twice daily to 25mg in the morning & 50mg at bedtime and monitor target behaviors; continue GDR as able and appropriate.  Quetiapine may increase risk of orthostasis, dizziness, falls, confusion, mortality, as well as elevate blood sugars, contribute to metabolic adverse effects.  Also on escitalopram, and of note is taking dose > 10mg recommended in geriatrics due to concern for QTc prolongation.  I do see an EKG was completed, and appears QTc interval ok (410ms).  Combo of quetiapine and escitalopram may further increase risk of QTc prolongation, so may benefit from periodic monitoring, especially if meds added that may contribute to QTc prolongation, or if symptoms noted.  2. (Diabetes):  On chart review, note that patient will refuse metformin at times, appears due to size of pills.  Epic med list indicates patient is taking 2000mg daily with breakfast, so I am thinking patient is on ER tabs, but this is not indicated in Epic.  Would likely be of benefit to change to the IR formulation, 1000mg tabs, and give 1000mg twice daily with food.  Could crush the tab and put in food if prefers.  Taking one tablet twice daily (vs 4 all at once), and the ability to crush the IR tabs if needed, may help with compliance.    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    Current Outpatient Medications   Medication Sig Dispense Refill     citalopram (CELEXA) 10 MG tablet Take 1 tablet (10 mg) by mouth daily       liraglutide (VICTOZA) 18 MG/3ML solution Inject 0.6 mg Subcutaneous daily for 7 days, THEN 1.2 mg daily for 7 days. 3 mL 11     metFORMIN (GLUCOPHAGE XR) 500 MG 24 hr tablet Take 4 tablets (2,000 mg) by mouth daily before breakfast       acetaminophen (TYLENOL) 500 MG tablet Take 500 mg by mouth every 6 hours as needed for mild pain       Carboxymethylcellulose Sodium 0.25 % SOLN Apply 1 drop to eye 3 times daily To both eyes TID for dry eyes       glucose (BD GLUCOSE) 4 g chewable tablet Take 4 tablets by mouth as needed for low blood sugar (for glucose <70)       insulin glargine (LANTUS PEN) 100 UNIT/ML pen Inject 30 Units Subcutaneous every morning       melatonin 3 MG tablet Take 12 mg by mouth At Bedtime       multivitamin, therapeutic (THERA-VIT) TABS tablet Take 1 tablet by mouth daily       QUEtiapine (SEROQUEL) 50 MG tablet Take 50 mg by mouth 3 times daily         REVIEW OF SYSTEMS:  10 point ROS including Respiratory, CV, GI and , other than that noted in the HPI,  is negative    Objective:   /63   Pulse 65   Temp 96.8  F (36  C)   Resp 18   Ht 1.524 m (5')   Wt 82.4 kg (181 lb 9.6 oz)   SpO2 92%   BMI 35.47 kg/m    GENERAL APPEARANCE:  Alert, in no distress, appears healthy, somnolent, elderlay male resting in bed  EYES:  EOM, conjunctivae, lids, pupils and irises normal  RESP:  no respiratory distress  CV:  no edema  M/S:   Gait and station abnormal - JA 2/2 patient being in  bed; Digits and nails abnormal - arthritic changes present  SKIN:  Inspection of skin and subcutaneous tissue baseline, Palpation of skin and subcutaneous tissue baseline  PSYCH:  oriented to self, insight and judgement impaired, memory impaired   **Minimal changes since prior visit**      Recent labs in [facility] reviewed by me today.     Assessment/Plan:    ICD-10-CM    1. Dementia with behavioral disturbance, unspecified dementia type (H)  F03.91 Acute on chronic - unstable. Resident continues with ongoing behaviors as noted   2. 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   3. Type 2 diabetes mellitus with other specified complication, with long-term current use of insulin (H)  E11.69 Chronic - stable. Recommendations as noted above - med list updated to reflect current tx regimen - Continue Metofrmin    Z79.4 ER 2000mg PO qAM along with Januvia   4. Medication management  Z79.899 Ramp as ordered with ongoing monitoring of BG levels. Plan to discontinue Glargine if able to reduce BG assessment burden.         Electronically signed by:   Dr. Whitney Espinal, APRN, DNP, AGNP-BC, PMHNP-[facility]  Office: [address] [place] [address]  [facility] Cell: [phone]  Fax: [phone]  Triage Phone: [phone]  Voicemail: [phone]    Email: [email or web address]

## 2022-08-19 NOTE — LETTER
8/19/2022        RE: Beltran Cotter  [facility]  [address]  [facility] [address]         HEALTH GERIATRIC SERVICES  Chief Complaint   Patient presents with     Nursing Home Acute     Recheck Medication         HPI:  Beltran Cotter is a 77 year old  (1945), who is being seen today for an episodic care visit at: [facility] () [[id number]].     Writer received following medication recommendations from [facility] Pharmacist:  Assessment/Recommendations:  1. (Dementia w/ behavioral disturbance, Depression):  Per chart review, history of aggressive behavior.  If patient has been stable and not currently at risk of harming self or others, and no upsetting hallucinations/delusions, may be of benefit to reduce current scheduled quetiapine from 50mg twice daily to 25mg in the morning & 50mg at bedtime and monitor target behaviors; continue GDR as able and appropriate.  Quetiapine may increase risk of orthostasis, dizziness, falls, confusion, mortality, as well as elevate blood sugars, contribute to metabolic adverse effects.  Also on escitalopram, and of note is taking dose > 10mg recommended in geriatrics due to concern for QTc prolongation.  I do see an EKG was completed, and appears QTc interval ok (410ms).  Combo of quetiapine and escitalopram may further increase risk of QTc prolongation, so may benefit from periodic monitoring, especially if meds added that may contribute to QTc prolongation, or if symptoms noted.  2. (Diabetes):  On chart review, note that patient will refuse metformin at times, appears due to size of pills.  Epic med list indicates patient is taking 2000mg daily with breakfast, so I am thinking patient is on ER tabs, but this is not indicated in Epic.  Would likely be of benefit to change to the IR formulation, 1000mg tabs, and give 1000mg twice daily with food.  Could crush the tab and put in food if prefers.  Taking one tablet twice daily (vs 4 all at once), and the ability  to crush the IR tabs if needed, may help with compliance.    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    Current Outpatient Medications   Medication Sig Dispense Refill     citalopram (CELEXA) 10 MG tablet Take 1 tablet (10 mg) by mouth daily       liraglutide (VICTOZA) 18 MG/3ML solution Inject 0.6 mg Subcutaneous daily for 7 days, THEN 1.2 mg daily for 7 days. 3 mL 11     metFORMIN (GLUCOPHAGE XR) 500 MG 24 hr tablet Take 4 tablets (2,000 mg) by mouth daily before breakfast       acetaminophen (TYLENOL) 500 MG tablet Take 500 mg by mouth every 6 hours as needed for mild pain       Carboxymethylcellulose Sodium 0.25 % SOLN Apply 1 drop to eye 3 times daily To both eyes TID for dry eyes       glucose (BD GLUCOSE) 4 g chewable tablet Take 4 tablets by mouth as needed for low blood sugar (for glucose <70)       insulin glargine (LANTUS PEN) 100 UNIT/ML pen Inject 30 Units Subcutaneous every morning       melatonin 3 MG tablet Take 12 mg by mouth At Bedtime       multivitamin, therapeutic (THERA-VIT) TABS tablet Take 1 tablet by mouth daily       QUEtiapine (SEROQUEL) 50 MG tablet Take 50 mg by mouth 3 times daily         REVIEW OF SYSTEMS:  10 point ROS including Respiratory, CV, GI and , other than that noted in the HPI,  is negative    Objective:   /63   Pulse 65   Temp 96.8  F (36  C)   Resp 18   Ht 1.524 m (5')   Wt 82.4 kg (181 lb 9.6 oz)   SpO2 92%   BMI 35.47 kg/m    GENERAL APPEARANCE:  Alert, in no distress, appears healthy, somnolent, elderlay male resting in bed  EYES:  EOM, conjunctivae, lids, pupils and irises  normal  RESP:  no respiratory distress  CV:  no edema  M/S:   Gait and station abnormal - JA 2/2 patient being in bed; Digits and nails abnormal - arthritic changes present  SKIN:  Inspection of skin and subcutaneous tissue baseline, Palpation of skin and subcutaneous tissue baseline  PSYCH:  oriented to self, insight and judgement impaired, memory impaired   **Minimal changes since prior visit**      Recent labs in [facility] reviewed by me today.     Assessment/Plan:    ICD-10-CM    1. Dementia with behavioral disturbance, unspecified dementia type (H)  F03.91 Acute on chronic - unstable. Resident continues with ongoing behaviors as noted   2. 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   3. Type 2 diabetes mellitus with other specified complication, with long-term current use of insulin (H)  E11.69 Chronic - stable. Recommendations as noted above - med list updated to reflect current tx regimen - Continue Metofrmin    Z79.4 ER 2000mg PO qAM along with Januvia   4. Medication management  Z79.899 Ramp as ordered with ongoing monitoring of BG levels. Plan to discontinue Glargine if able to reduce BG assessment burden.         Electronically signed by:   TEZ Hernadez, DNP, AGNP-BC, PMHNP-BC  [facility]  Office: [address] [facility] [address] [place], [place] [address] [place] [address] Cell: [phone]  Fax: [phone]  Triage Phone: [phone]  Voicemail: [phone]    Email: [email or web address]               Sincerely,        TEZ Ch CNP
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
## 2022-09-02 PROBLEM — E66.01 MORBID OBESITY (H): Status: ACTIVE | Noted: 2022-01-01
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
## 2022-09-02 NOTE — LETTER
9/2/2022        RE: Beltran Cotter  [facility]  [address] [place] [address]        No notes on file      Sincerely,        TEZ Ch CNP

## 2022-09-07 NOTE — CONFIDENTIAL NOTE
[facility] Triage Nurse Telephone Encounter    Provider: TEZ Silver DNP  Facility: [facility] Facility Type:  [facility]    Caller: Miranda  Call Back Number: [phone]    Allergies:  No Known Allergies     Reason for call: Nurse manager called with an update on behaviors per NP request.  Patient continues to be resistive with cares.  Patient starting to pull away from his wife.  Staff reporting it takes about an hour to perform AM cares.  Patient currently receiving Celexa 15 mg po daily and Haldol 0.5 mg po in AM and 1 mg po at 2 pm and HS.        Verbal Order/Direction given by Provider: Increase Haldol 1 mg po TID and try to give morning Haldol 30 minutes prior to cares.      Provider giving Order:  TEZ Silver DNP    Verbal Order given to: Miranda Acosta RN

## 2022-09-08 NOTE — TELEPHONE ENCOUNTER
[facility] Triage Nurse Telephone Encounter    Provider: TEZ Silver DNP  Facility: [facility] Facility Type:  [facility]    Caller: Kristin   Call Back Number: [phone]    Allergies:  No Known Allergies     Reason for call: Pt was up all night long walking around into peoples rooms, banging on doors. Pt has been taking his medications, currently on haldol 1mg tid.     Verbal Order/Direction given by Provider: Give haldol 5mg po x1 now, then increase haldol to 3mg tid.     Provider giving Order:  TEZ Silver DNP    Verbal Order given to: Kristin Juares RN

## 2022-09-13 NOTE — PROGRESS NOTES
[facility] GERIATRICS  REGULATORY VISIT  September 14, 2022      [facility] Medical Record Number:  [id number]  Place of Service where encounter took place:  [facility] () [[id number]]    Chief Complaint   Patient presents with     half-way Regulatory       HPI:    Beltran Cotter is a 77 year old  (1945), who is being seen today for a federally mandated E/M visit at [facility] where he has resided since June 30 when he was admitted from home. HPI information obtained from: facility chart records, facility staff and [facility] [employer] [facility] chart review.    Today, Mr. Cotter is seen in the memory care unit.  He is a limited historian (BIMS 0 and/15) is nonverbal.  When he first admitted to the [facility], he would make some sounds, but now he does not speak. Nursing had concerns earlier this week that he seems more restless.  Medications were adjusted yesterday - he is no longer on haloperidol nd is being started on risperidone.  He has some skin involvement this with him.  Apart from concerns about the restlessness and perhaps agitation, no other concerns per discussion with nursing today.     ALLERGIES:  No Known Allergies     Past Medical, Surgical, Family and Social History: Reviewed and updated in EPIC.    MEDICATIONS:  Current Outpatient Medications   Medication Sig Dispense Refill     acetaminophen (TYLENOL) 500 MG tablet Take 500 mg by mouth 3 times daily       Carboxymethylcellulose Sodium 0.25 % SOLN Apply 1 drop to eye 3 times daily To both eyes TID for dry eyes       citalopram (CELEXA) 10 MG tablet Take 15 mg by mouth daily       glucose (BD GLUCOSE) 4 g chewable tablet Take 4 tablets by mouth as needed for low blood sugar (for glucose <70)       insulin glargine (LANTUS PEN) 100 UNIT/ML pen Inject 30 Units Subcutaneous every morning       liraglutide (VICTOZA) 18 MG/3ML solution Inject 1.2 mg Subcutaneous daily       melatonin 3 MG tablet Take 12 mg by mouth At Bedtime        metFORMIN (GLUCOPHAGE XR) 500 MG 24 hr tablet Take 4 tablets (2,000 mg) by mouth daily before breakfast       multivitamin, therapeutic (THERA-VIT) TABS tablet Take 1 tablet by mouth daily       senna-docusate (SENOKOT-S/PERICOLACE) 8.6-50 MG tablet Take 2 tablets by mouth 2 times daily And 1 tab daily PRN       risperiDONE (RISPERDAL) 1 MG tablet Take 1 tablet (1 mg) by mouth 2 times daily. May also take 1 tablet (1 mg) 2 times daily as needed (Agitation/Aggression).       Medications reviewed:  Medications reconciled to facility chart and changes were made to reflect current medications as identified as above med list. Below are the changes that were made:   Medications stopped since last EPIC medication reconciliation:   Medications Discontinued During This Encounter   Medication Reason     liraglutide (VICTOZA) 18 MG/3ML solution      Medications started since last [facility] medication reconciliation:  Orders Placed This Encounter   Medications     liraglutide (VICTOZA) 18 MG/3ML solution     Sig: Inject 1.2 mg Subcutaneous daily     senna-docusate (SENOKOT-S/PERICOLACE) 8.6-50 MG tablet     Sig: Take 2 tablets by mouth 2 times daily And 1 tab daily PRN     REVIEW OF SYSTEMS:  Unable to be obtained due to cognitive impairment or aphasia. BIMS 0/15    PHYSICAL EXAM:  /63   Pulse 64   Temp 97.4  F (36.3  C)   Resp 18   Ht 1.524 m (5')   Wt 81.6 kg (179 lb 12.8 oz)   SpO2 96%   BMI 35.11 kg/m    Gen: ambulating about the unit without an assistive device; he has a rubber ball with some ribbons and other sensory items attached to it in his right hand  Resp: Breathing nonlabored, no tachypnea  Ext: Decreased muscle tone; no LE edema  Neuro: CX II-XII grossly in tact; ROM in all four extremities grossly in tact  Psych: memory, judgement and insight impaired      LABS/IMAGING: Reviewed as per Epic and/or [facility]    ASSESSMENT / PLAN:    Dementia With Behavioral Disturbance  BIMS 0/15. Recent med changes  as he's been unsettled.   -- citalopram 15 mg daily  -- risperidone 1 mg BID and 1 mg BID PRN for agitation  -- ongoing 24/7 nursing and supportive cares    DM, Type II  Hgb A1c 9.6 last month. New on liraglutide since I last saw him. Sugars 130s-160s (am), 180s (dior) and 150s-180s (hs)  -- glargine 30 units in the morning  -- liraglutide 1.2 mg subQ daily   -- metformin 2000 mg in the morning  -- follow sugars, A1c per goals of care     HTN  SBPs 120s. HR 60s. Wt stable at 186 lbs. He is not on any anti-HTN medications.   -- follow BPs     Electronically signed by  Gail Milian MD

## 2022-09-14 NOTE — LETTER
9/14/2022        RE: Beltran Cotter  [facility]  [address] [place] [address]        [facility] GERIATRICS  REGULATORY VISIT  September 14, 2022      [facility] Medical Record Number:  [id number]  Place of Service where encounter took place:  [facility] () [[id number]]    Chief Complaint   Patient presents with     retirement Regulatory       HPI:    Beltran Cotter is a 77 year old  (1945), who is being seen today for a federally mandated E/M visit at [facility] where he has resided since June 30 when he was admitted from home. HPI information obtained from: facility chart records, facility staff and [facility] chart review.    Today, Mr. Cotter is seen in the memory care unit.  He is a limited historian (BIMS 0 and/15) is nonverbal.  When he first admitted to the [facility] center, he would make some sounds, but now he does not speak. Nursing had concerns earlier this week that he seems more restless.  Medications were adjusted yesterday - he is no longer on haloperidol nd is being started on risperidone.  He has some skin involvement this with him.  Apart from concerns about the restlessness and perhaps agitation, no other concerns per discussion with nursing today.     ALLERGIES:  No Known Allergies     Past Medical, Surgical, Family and Social History: Reviewed and updated in EPIC.    MEDICATIONS:  Current Outpatient Medications   Medication Sig Dispense Refill     acetaminophen (TYLENOL) 500 MG tablet Take 500 mg by mouth 3 times daily       Carboxymethylcellulose Sodium 0.25 % SOLN Apply 1 drop to eye 3 times daily To both eyes TID for dry eyes       citalopram (CELEXA) 10 MG tablet Take 15 mg by mouth daily       glucose (BD GLUCOSE) 4 g chewable tablet Take 4 tablets by mouth as needed for low blood sugar (for glucose <70)       insulin glargine (LANTUS PEN) 100 UNIT/ML pen Inject 30 Units Subcutaneous every morning       liraglutide (VICTOZA) 18 MG/3ML  solution Inject 1.2 mg Subcutaneous daily       melatonin 3 MG tablet Take 12 mg by mouth At Bedtime       metFORMIN (GLUCOPHAGE XR) 500 MG 24 hr tablet Take 4 tablets (2,000 mg) by mouth daily before breakfast       multivitamin, therapeutic (THERA-VIT) TABS tablet Take 1 tablet by mouth daily       senna-docusate (SENOKOT-S/PERICOLACE) 8.6-50 MG tablet Take 2 tablets by mouth 2 times daily And 1 tab daily PRN       risperiDONE (RISPERDAL) 1 MG tablet Take 1 tablet (1 mg) by mouth 2 times daily. May also take 1 tablet (1 mg) 2 times daily as needed (Agitation/Aggression).       Medications reviewed:  Medications reconciled to facility chart and changes were made to reflect current medications as identified as above med list. Below are the changes that were made:   Medications stopped since last EPIC medication reconciliation:   Medications Discontinued During This Encounter   Medication Reason     liraglutide (VICTOZA) 18 MG/3ML solution      Medications started since last [facility] medication reconciliation:  Orders Placed This Encounter   Medications     liraglutide (VICTOZA) 18 MG/3ML solution     Sig: Inject 1.2 mg Subcutaneous daily     senna-docusate (SENOKOT-S/PERICOLACE) 8.6-50 MG tablet     Sig: Take 2 tablets by mouth 2 times daily And 1 tab daily PRN     REVIEW OF SYSTEMS:  Unable to be obtained due to cognitive impairment or aphasia. WendenS 0/15    PHYSICAL EXAM:  /63   Pulse 64   Temp 97.4  F (36.3  C)   Resp 18   Ht 1.524 m (5')   Wt 81.6 kg (179 lb 12.8 oz)   SpO2 96%   BMI 35.11 kg/m    Gen: ambulating about the unit without an assistive device; he has a rubber ball with some ribbons and other sensory items attached to it in his right hand  Resp: Breathing nonlabored, no tachypnea  Ext: Decreased muscle tone; no LE edema  Neuro: CX II-XII grossly in tact; ROM in all four extremities grossly in tact  Psych: memory, judgement and insight impaired      LABS/IMAGING: Reviewed as per Epic and/or  [facility]    ASSESSMENT / PLAN:    Dementia With Behavioral Disturbance  BIMS 0/15. Recent med changes as he's been unsettled.   -- citalopram 15 mg daily  -- risperidone 1 mg BID and 1 mg BID PRN for agitation  -- ongoing 24/7 nursing and supportive cares    DM, Type II  Hgb A1c 9.6 last month. New on liraglutide since I last saw him. Sugars 130s-160s (am), 180s (dior) and 150s-180s (hs)  -- glargine 30 units in the morning  -- liraglutide 1.2 mg subQ daily   -- metformin 2000 mg in the morning  -- follow sugars, A1c per goals of care     HTN  SBPs 120s. HR 60s. Wt stable at 186 lbs. He is not on any anti-HTN medications.   -- follow BPs     Electronically signed by  Gail Milian MD                Sincerely,        Gail Milian MD

## 2022-09-14 NOTE — PROGRESS NOTES
Ongoing noted behaviors - will discontinue Haldol at this time and start Risperidal 1mg PO BID along with Risperidal 1mg PO qwDay PRN for agitation/aggression. Care conference planned with facility on 9/28 - writer plans to attend to obtain goals of care.     Dr. Whitney Espinal, APRN, DNP, AGNP-BC, PMHNP-BC  [facility]  Office Hours: Tues-Fri 7627-9060  Office: [address] [place] [address]  Fax - [phone]  Triage Phone- [phone]  Business Office- [phone]      Email: [email or web address]

## 2022-09-15 NOTE — CONFIDENTIAL NOTE
[facility] Triage Nurse Telephone Encounter    Provider: TEZ Silver DNP  Facility: [facility] Facility Type:  LT    Caller: Brissa  Call Back Number:     Allergies:  No Known Allergies     Reason for call: Nurse called to report that patient has had 2 falls within 24 hours.  Patient's psych medications are currently being adjusted.  Patient has sustained no injuries with the falls and vitals stable.  Facility will continue to monitor vitals and neuro's.      Verbal Order/Direction given by Provider: FYI    Provider giving Order:  TEZ Silver DNP    Verbal Order given to: Brissa Acosta RN

## 2022-09-19 NOTE — CONFIDENTIAL NOTE
"[facility] NON-EMERGENT ENCOUNTER    Beltran Cotter is a 77 year old  (1945), call received today regarding:   Unwitnessed fall    Disposition    Nurse called to report that patient was found in another patient's room laying on the floor covered with a blanket.  Unclear if patient actually fell or just laid down on the floor.  No injuries noted.  Patient stated he was \"tired\".  Facility monitoring per fall protocol.      Requests    FYI      Electronically signed by:   Rosa Acosta RN  "
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
## 2022-10-17 NOTE — TELEPHONE ENCOUNTER
[facility] GERIATRIC SERVICES NON-EMERGENT VOICEMAIL WEEKEND ENCOUNTER    Pt slid out of his w/c found sitting on the floor in front of his w/c, no injury. This was over the weekend.     Niki Juares RN

## 2022-10-26 NOTE — PROGRESS NOTES
[facility] GERIATRICS    Chief Complaint   Patient presents with     Mental Health Problem     HPI:  Beltran Cotter is a 77 year old  (1945), who is being seen today for an episodic care visit at: [facility] () [[id number]]. Today's concern is: anxiety and dementia.     Lopez has a 4-year history of Alzheimer's dementia, recently moved into a care home due to inability to safely stay in his own home with his wife.  He moved to long-term care in July, and since that time has had multiple medication changes due to anxiety and behavior disturbance.  He is often lowering himself out of his bed onto the floor, pacing hallway putting him at risk for falls, resistant to cares, and difficult to redirect due to advanced stage dementia.  Per his wife, his dementia seemed to rapidly worsen over the last several months.  Wife who is available for appointment right now, says she has reached out to a hospice agency to evaluate.  DEBRA is also here to give some background on recent med changes.  He started on Lexapro and Seroquel, switched to Haldol as well as celexa and tapered off Lexapro.  When that failed, he was switched from Haldol to Risperdal.  Continues on Risperdal and Celexa, propanolol was later added for possible EPS side effects and fidgeting.  Per nurse manager she does not see any improvement in the anxiety, agitation and ability to redirect or keep patient calm.  She reports that he is essentially a one-to-one when his wife is not available in the facility.   Long discussion with wife and DEBRA today, wife Aimee says she has recently come to terms with the fact that he may be a hospice candidate and she has reached out to an agency.  She desires comfort for her  and is okay with medications to help alleviate his observed agitation.  After long discussion regarding the multiple med changes, she is in agreement to trial lorazepam.  We discussed risks including increased risk for falls and  sedation however now that patient is in the Broda chair he seems to be more comfortable sitting up in his chair.    Allergies, and PMH/PSH reviewed in EPIC today.  REVIEW OF SYSTEMS:   Unobtainable secondary to cognitive impairment.     Objective:   There were no vitals taken for this visit.  Physical Exam   General appearance: alert, appears stated age.  Head: Normocephalic, without obvious abnormality, atraumatic, Eyes: sclera anicteric.  Lungs: respirations unlabored.   Extremities: extremities normal, atraumatic, no cyanosis or edema  Skin: Skin color, texture, turgor normal. No rashes or lesions  Neurologic: unable to assess.  Psych: nonverbal, appears anxious, fidgeting, unsettled.       Most Recent 3 CBC's:Recent Labs   Lab Test 08/09/22  0638   WBC 8.0   HGB 14.7   MCV 93        Most Recent 3 BMP's:Recent Labs   Lab Test 08/09/22  0638      POTASSIUM 3.7   CHLORIDE 102   CO2 28   BUN 15.1   CR 0.77   ANIONGAP 8   NADIYA 9.3   GLC 88       Assessment/Plan:  (G30.0,  F02.C4) Severe early onset Alzheimer's dementia with anxiety (H)  Comment:   Plan:   -Continue discussions for hospice evaluation.  -Start lorazepam 0.25mg po bid prn.   -Follow up 1 week or sooner regarding effectiveness.       Electronically signed by: Radha Smith CNP

## 2022-10-26 NOTE — LETTER
10/26/2022        RE: Beltran Cotter  [facility]  [address] [place] [address]        [facility] GERIATRICS    Chief Complaint   Patient presents with     Mental Health Problem     HPI:  Beltran Cotter is a 77 year old  (1945), who is being seen today for an episodic care visit at: [facility] () [[id number]]. Today's concern is: anxiety and dementia.     Lopez has a 4-year history of Alzheimer's dementia, recently moved into a care home due to inability to safely stay in his own home with his wife.  He moved to long-term care in July, and since that time has had multiple medication changes due to anxiety and behavior disturbance.  He is often lowering himself out of his bed onto the floor, pacing hallway putting him at risk for falls, resistant to cares, and difficult to redirect due to advanced stage dementia.  Per his wife, his dementia seemed to rapidly worsen over the last several months.  Wife who is available for appointment right now, says she has reached out to a hospice agency to evaluate.  DEBRA is also here to give some background on recent med changes.  He started on Lexapro and Seroquel, switched to Haldol as well as celexa and tapered off Lexapro.  When that failed, he was switched from Haldol to Risperdal.  Continues on Risperdal and Celexa, propanolol was later added for possible EPS side effects and fidgeting.  Per nurse manager she does not see any improvement in the anxiety, agitation and ability to redirect or keep patient calm.  She reports that he is essentially a one-to-one when his wife is not available in the facility.   Long discussion with wife and DEBRA today, wife Aimee says she has recently come to terms with the fact that he may be a hospice candidate and she has reached out to an agency.  She desires comfort for her  and is okay with medications to help alleviate his observed agitation.  After long discussion regarding the multiple med  changes, she is in agreement to trial lorazepam.  We discussed risks including increased risk for falls and sedation however now that patient is in the Broda chair he seems to be more comfortable sitting up in his chair.    Allergies, and PMH/PSH reviewed in EPIC today.  REVIEW OF SYSTEMS:   Unobtainable secondary to cognitive impairment.     Objective:   There were no vitals taken for this visit.  Physical Exam   General appearance: alert, appears stated age.  Head: Normocephalic, without obvious abnormality, atraumatic, Eyes: sclera anicteric.  Lungs: respirations unlabored.   Extremities: extremities normal, atraumatic, no cyanosis or edema  Skin: Skin color, texture, turgor normal. No rashes or lesions  Neurologic: unable to assess.  Psych: nonverbal, appears anxious, fidgeting, unsettled.       Most Recent 3 CBC's:Recent Labs   Lab Test 08/09/22  0638   WBC 8.0   HGB 14.7   MCV 93        Most Recent 3 BMP's:Recent Labs   Lab Test 08/09/22  0638      POTASSIUM 3.7   CHLORIDE 102   CO2 28   BUN 15.1   CR 0.77   ANIONGAP 8   NADIYA 9.3   GLC 88       Assessment/Plan:  (G30.0,  F02.C4) Severe early onset Alzheimer's dementia with anxiety (H)  Comment:   Plan:   -Continue discussions for hospice evaluation.  -Start lorazepam 0.25mg po bid prn.   -Follow up 1 week or sooner regarding effectiveness.       Electronically signed by: Radha Smith CNP             Sincerely,        Radha Smith CNP

## 2022-11-02 NOTE — PROGRESS NOTES
[facility] GERIATRICS  Chief Complaint   Patient presents with     Annual Comprehensive Nursing Home     [facility] Record Number:  [id number]  Place of Service where encounter took place:  [facility] () [[id number]]    HPI:    Beltran Cotter  is a 77 year old  (1945), who is being seen today for an annual comprehensive visit. HPI information obtained from: facility chart records, facility staff, [facility] chart review and family/first contact Aimee report.     Lopez has a  background: 4-year history of Alzheimer's dementia, recently moved into a care home due to inability to safely stay in his own home with his wife.  He moved to long-term care in July, and since that time has had multiple medication changes due to anxiety and behavior disturbance.  He is often lowering himself out of his bed onto the floor, pacing hallway putting him at risk for falls, resistant to cares, and difficult to redirect due to advanced stage dementia.  Per his wife, his dementia seemed to rapidly worsen over the last several months.    DEBRA is also here to give some background on recent med changes.  He started on Lexapro and Seroquel, switched to Haldol as well as celexa and tapered off Lexapro.  When that failed, he was switched from Haldol to Risperdal.  Continues on Risperdal and Celexa, propanolol was later added for possible EPS side effects and fidgeting.  Per nurse manager she does not see any improvement in the anxiety, agitation and ability to redirect or keep patient calm.  She reports that he is essentially a one-to-one when his wife is not available in the facility.    Today: Lopez was seen for annual visit as well as follow-up to last weeks medication changes.  We added a low-dose Ativan 0.25 mg twice a day which has objectively seem to improve overall anxiety symptoms however wife is also concerned about oversedation.  She meets with hospice tomorrow and at that time she would like to follow-up on  the medication discussion with them.  She is happy to continue with the Ativan for now.  He has been occasionally fidgeting in his wheelchair to the point of almost tipping it backwards.  He does have a Broda chair now that seems to be improving overall agitation and wife walks around the facility throughout the day.       ALLERGIES: Patient has no known allergies.  PAST MEDICAL HISTORY:   Past Medical History:   Diagnosis Date     Adenoma of large intestine      Alzheimer's disease (H)      Background diabetic retinopathy (H)      Cataract      Diabetes mellitus (H)      Hearing loss      Peripheral neuropathy      Polio       PAST SURGICAL HISTORY:  has a past surgical history that includes Cholecystectomy; CATARACT REMOVAL; ORIF LEFT ANKLE FX; and colonoscopy.  IMMUNIZATIONS:  Immunization History   Administered Date(s) Administered     COVID-19,PF,Moderna 11/02/2021, 08/05/2022     Above immunizations pulled from [employer]. MIIC and facility records also reconciled. Outstanding information sent to  to update [employer].  Future immunizations are not needed at this point as all recommended immunizations are up to date.       Current Outpatient Medications:      acetaminophen (TYLENOL) 500 MG tablet, Take 500 mg by mouth 3 times daily, Disp: , Rfl:      Carboxymethylcellulose Sodium 0.25 % SOLN, Apply 1 drop to eye 3 times daily To both eyes TID for dry eyes, Disp: , Rfl:      citalopram (CELEXA) 10 MG tablet, Take 15 mg by mouth daily, Disp: , Rfl:      glucose (BD GLUCOSE) 4 g chewable tablet, Take 4 tablets by mouth as needed for low blood sugar (for glucose <70), Disp: , Rfl:      insulin glargine (LANTUS PEN) 100 UNIT/ML pen, Inject 30 Units Subcutaneous every morning, Disp: , Rfl:      liraglutide (VICTOZA) 18 MG/3ML solution, Inject 1.2 mg Subcutaneous daily, Disp: , Rfl:      LORazepam (ATIVAN) 0.5 MG tablet, Take 1 tablet (0.5 mg) by mouth every 6 hours as needed for agitation,  anxiety or pain, Disp: 30 tablet, Rfl: 0     melatonin 3 MG tablet, Take 12 mg by mouth At Bedtime, Disp: , Rfl:      metFORMIN (GLUCOPHAGE XR) 500 MG 24 hr tablet, Take 4 tablets (2,000 mg) by mouth daily before breakfast, Disp: , Rfl:      multivitamin, therapeutic (THERA-VIT) TABS tablet, Take 1 tablet by mouth daily, Disp: , Rfl:      risperiDONE (RISPERDAL) 1 MG tablet, Take 1 tablet (1 mg) by mouth 2 times daily AND 2 tablets (2 mg) At Bedtime., Disp: , Rfl:      senna-docusate (SENOKOT-S/PERICOLACE) 8.6-50 MG tablet, Take 2 tablets by mouth 2 times daily And 1 tab daily PRN, Disp: , Rfl:      MED REC REQUIRED  Post Medication Reconciliation Status:  Discharge medications reconciled, continue medications without change        Case Management:  I have reviewed the facility/SNF care plan/MDS, including the falls risk, nutrition and pain screening. I also reviewed the current immunizations, and preventive care.. Future cancer screening is not clinically indicated secondary to age/goals of care Patient's desire to return to the community is not assessible due to cognitive impairment. Current Level of Care is appropriate.    Advance Directive Discussion:    I reviewed the current advanced directives as reflected in EPIC, the POLST and the facility chart, and verified the congruency of orders. I contacted the first party, patient's wife, Aimee and discussed the plan of Care.  I did review the advance directives with the resident. Patient's goal is family's/responsible party's goal for the patient is comfort.    Team Discussion:  I communicated with the appropriate disciplines involved with the Plan of Care:   Nursing   and     Information reviewed:  Medications, vital signs, orders, and nursing notes.    ROS:  Unobtainable secondary to cognitive impairment.     Vitals:  /67   Pulse 88   Temp 97.1  F (36.2  C)   Resp 18   Ht 1.524 m (5')   Wt 73.3 kg (161 lb 8 oz)   SpO2 97%   BMI 31.54  kg/m   Body mass index is 31.54 kg/m .  Exam:  Physical Exam   General appearance: alert, appears stated age and cooperative.   Head: Normocephalic, without obvious abnormality, atraumatic, Eyes: sclera anicteric.  Lungs: respirations unlabored on room air  Extremities: extremities normal, atraumatic, no cyanosis or edema  Skin: Skin color, texture, turgor normal. No rashes or lesions were visible  Neurologic: Unable to assess, patient is nonverbal.  Psych: Anxious, fidgeting.    Lab/Diagnostic data:   Recent labs in [facility] reviewed by me today.     ASSESSMENT/PLAN  (E11.69) Type 2 diabetes mellitus with other specified complication, unspecified whether long term insulin use (H)  (primary encounter diagnosis)  Comment: Discussed reducing intensity of therapy, wait for wait until hospice evaluation and likely de-escalate medications and interventions.    (G30.9,  F02.80) Alzheimer's disease (H)  (G30.0,  F02.C0) Severe early onset Alzheimer's dementia without behavioral disturbance, psychotic disturbance, mood disturbance, or anxiety (H)  Plan:   -Continue discussions for hospice evaluation.  -Continue lorazepam 0.25mg po bid prn.         Electronically signed by:  Radha Smith, CNP

## 2022-11-02 NOTE — LETTER
11/2/2022        RE: Beltran Cotter  [facility]  [address] [place] [address]        [facility] GERIATRICS  Chief Complaint   Patient presents with     Annual Comprehensive Nursing Home     [place] Medical Record Number:  [id number]  Place of Service where encounter took place:  [facility] () [[id number]]    HPI:    Beltran Cotter  is a 77 year old  (1945), who is being seen today for an annual comprehensive visit. HPI information obtained from: facility chart records, facility staff, [facility] chart review and family/first contact Aimee report.     Lopez has a  background: 4-year history of Alzheimer's dementia, recently moved into a care home due to inability to safely stay in his own home with his wife.  He moved to long-term care in July, and since that time has had multiple medication changes due to anxiety and behavior disturbance.  He is often lowering himself out of his bed onto the floor, pacing hallway putting him at risk for falls, resistant to cares, and difficult to redirect due to advanced stage dementia.  Per his wife, his dementia seemed to rapidly worsen over the last several months.    DEBRA is also here to give some background on recent med changes.  He started on Lexapro and Seroquel, switched to Haldol as well as celexa and tapered off Lexapro.  When that failed, he was switched from Haldol to Risperdal.  Continues on Risperdal and Celexa, propanolol was later added for possible EPS side effects and fidgeting.  Per nurse manager she does not see any improvement in the anxiety, agitation and ability to redirect or keep patient calm.  She reports that he is essentially a one-to-one when his wife is not available in the facility.    Today: Lopez was seen for annual visit as well as follow-up to last weeks medication changes.  We added a low-dose Ativan 0.25 mg twice a day which has objectively seem to improve overall anxiety symptoms however wife is also  concerned about oversedation.  She meets with hospice tomorrow and at that time she would like to follow-up on the medication discussion with them.  She is happy to continue with the Ativan for now.  He has been occasionally fidgeting in his wheelchair to the point of almost tipping it backwards.  He does have a Broda chair now that seems to be improving overall agitation and wife walks around the facility throughout the day.       ALLERGIES: Patient has no known allergies.  PAST MEDICAL HISTORY:   Past Medical History:   Diagnosis Date     Adenoma of large intestine      Alzheimer's disease (H)      Background diabetic retinopathy (H)      Cataract      Diabetes mellitus (H)      Hearing loss      Peripheral neuropathy      Polio       PAST SURGICAL HISTORY:  has a past surgical history that includes Cholecystectomy; CATARACT REMOVAL; ORIF LEFT ANKLE FX; and colonoscopy.  IMMUNIZATIONS:  Immunization History   Administered Date(s) Administered     COVID-19,PF,Moderna 11/02/2021, 08/05/2022     Above immunizations pulled from [employer] MIIC and facility records also reconciled. Outstanding information sent to  to update [employer]  Future immunizations are not needed at this point as all recommended immunizations are up to date.       Current Outpatient Medications:      acetaminophen (TYLENOL) 500 MG tablet, Take 500 mg by mouth 3 times daily, Disp: , Rfl:      Carboxymethylcellulose Sodium 0.25 % SOLN, Apply 1 drop to eye 3 times daily To both eyes TID for dry eyes, Disp: , Rfl:      citalopram (CELEXA) 10 MG tablet, Take 15 mg by mouth daily, Disp: , Rfl:      glucose (BD GLUCOSE) 4 g chewable tablet, Take 4 tablets by mouth as needed for low blood sugar (for glucose <70), Disp: , Rfl:      insulin glargine (LANTUS PEN) 100 UNIT/ML pen, Inject 30 Units Subcutaneous every morning, Disp: , Rfl:      liraglutide (VICTOZA) 18 MG/3ML solution, Inject 1.2 mg Subcutaneous daily, Disp: , Rfl:       LORazepam (ATIVAN) 0.5 MG tablet, Take 1 tablet (0.5 mg) by mouth every 6 hours as needed for agitation, anxiety or pain, Disp: 30 tablet, Rfl: 0     melatonin 3 MG tablet, Take 12 mg by mouth At Bedtime, Disp: , Rfl:      metFORMIN (GLUCOPHAGE XR) 500 MG 24 hr tablet, Take 4 tablets (2,000 mg) by mouth daily before breakfast, Disp: , Rfl:      multivitamin, therapeutic (THERA-VIT) TABS tablet, Take 1 tablet by mouth daily, Disp: , Rfl:      risperiDONE (RISPERDAL) 1 MG tablet, Take 1 tablet (1 mg) by mouth 2 times daily AND 2 tablets (2 mg) At Bedtime., Disp: , Rfl:      senna-docusate (SENOKOT-S/PERICOLACE) 8.6-50 MG tablet, Take 2 tablets by mouth 2 times daily And 1 tab daily PRN, Disp: , Rfl:      MED REC REQUIRED  Post Medication Reconciliation Status:  Discharge medications reconciled, continue medications without change        Case Management:  I have reviewed the facility/SNF care plan/MDS, including the falls risk, nutrition and pain screening. I also reviewed the current immunizations, and preventive care.. Future cancer screening is not clinically indicated secondary to age/goals of care Patient's desire to return to the community is not assessible due to cognitive impairment. Current Level of Care is appropriate.    Advance Directive Discussion:    I reviewed the current advanced directives as reflected in EPIC, the POLST and the facility chart, and verified the congruency of orders. I contacted the first party, patient's wife, Aimee and discussed the plan of Care.  I did review the advance directives with the resident. Patient's goal is family's/responsible party's goal for the patient is comfort.    Team Discussion:  I communicated with the appropriate disciplines involved with the Plan of Care:   Nursing   and     Information reviewed:  Medications, vital signs, orders, and nursing notes.    ROS:  Unobtainable secondary to cognitive impairment.     Vitals:  /67   Pulse 88   Temp  97.1  F (36.2  C)   Resp 18   Ht 1.524 m (5')   Wt 73.3 kg (161 lb 8 oz)   SpO2 97%   BMI 31.54 kg/m   Body mass index is 31.54 kg/m .  Exam:  Physical Exam   General appearance: alert, appears stated age and cooperative.   Head: Normocephalic, without obvious abnormality, atraumatic, Eyes: sclera anicteric.  Lungs: respirations unlabored on room air  Extremities: extremities normal, atraumatic, no cyanosis or edema  Skin: Skin color, texture, turgor normal. No rashes or lesions were visible  Neurologic: Unable to assess, patient is nonverbal.  Psych: Anxious, fidgeting.    Lab/Diagnostic data:   Recent labs in [facility] reviewed by me today.     ASSESSMENT/PLAN  (E11.69) Type 2 diabetes mellitus with other specified complication, unspecified whether long term insulin use (H)  (primary encounter diagnosis)  Comment: Discussed reducing intensity of therapy, wait for wait until hospice evaluation and likely de-escalate medications and interventions.    (G30.9,  F02.80) Alzheimer's disease (H)  (G30.0,  F02.C0) Severe early onset Alzheimer's dementia without behavioral disturbance, psychotic disturbance, mood disturbance, or anxiety (H)  Plan:   -Continue discussions for hospice evaluation.  -Continue lorazepam 0.25mg po bid prn.         Electronically signed by:  Radha Smith CNP            Sincerely,        Radha Smith CNP

## 2022-11-03 PROBLEM — G30.0: Status: ACTIVE | Noted: 2022-01-01

## 2022-11-03 PROBLEM — F02.C4: Status: ACTIVE | Noted: 2022-01-01

## 2022-11-10 PROBLEM — G30.9 ALZHEIMER'S DISEASE (H): Status: ACTIVE | Noted: 2022-01-01

## 2022-11-10 PROBLEM — G30.0: Status: RESOLVED | Noted: 2022-01-01 | Resolved: 2022-01-01

## 2022-11-10 PROBLEM — F03.90 UNSPECIFIED DEMENTIA, UNSPECIFIED SEVERITY, WITHOUT BEHAVIORAL DISTURBANCE, PSYCHOTIC DISTURBANCE, MOOD DISTURBANCE, AND ANXIETY (H): Status: ACTIVE | Noted: 2022-01-01

## 2022-11-10 PROBLEM — F02.C4: Status: RESOLVED | Noted: 2022-01-01 | Resolved: 2022-01-01

## 2022-11-10 PROBLEM — F02.80 ALZHEIMER'S DISEASE (H): Status: ACTIVE | Noted: 2022-01-01

## 2022-11-14 ENCOUNTER — TELEPHONE (OUTPATIENT)
Dept: GERIATRICS | Facility: CLINIC | Age: 77
End: 2022-11-14

## 2022-11-14 NOTE — TELEPHONE ENCOUNTER
Geriatrics Notification of Patient Death    Provider: TEZ Silver DNP  Place of death: [facility]  Facility Type:  LTC    Caller: [facility]   Date of Death: 11/12/22 @1755    Patient was on Hospice care: Yes; please explain: [facility]   Patient was seen by [facility] Geriatrics provider: Yes; please explain: Jackie Smith NP on 11/2/22        iNki Juares RN